# Patient Record
Sex: MALE | Race: WHITE | URBAN - METROPOLITAN AREA
[De-identification: names, ages, dates, MRNs, and addresses within clinical notes are randomized per-mention and may not be internally consistent; named-entity substitution may affect disease eponyms.]

---

## 2023-01-11 ENCOUNTER — OFFICE VISIT (OUTPATIENT)
Dept: URBAN - METROPOLITAN AREA CLINIC 96 | Facility: CLINIC | Age: 38
End: 2023-01-11
Payer: COMMERCIAL

## 2023-01-11 ENCOUNTER — LAB OUTSIDE AN ENCOUNTER (OUTPATIENT)
Dept: URBAN - METROPOLITAN AREA CLINIC 96 | Facility: CLINIC | Age: 38
End: 2023-01-11

## 2023-01-11 ENCOUNTER — WEB ENCOUNTER (OUTPATIENT)
Dept: URBAN - METROPOLITAN AREA CLINIC 96 | Facility: CLINIC | Age: 38
End: 2023-01-11

## 2023-01-11 VITALS
DIASTOLIC BLOOD PRESSURE: 77 MMHG | WEIGHT: 178 LBS | TEMPERATURE: 97.9 F | HEIGHT: 68 IN | SYSTOLIC BLOOD PRESSURE: 126 MMHG | HEART RATE: 87 BPM | BODY MASS INDEX: 26.98 KG/M2

## 2023-01-11 DIAGNOSIS — R79.89 ELEVATED LIVER FUNCTION TESTS: ICD-10-CM

## 2023-01-11 DIAGNOSIS — Z87.19 HISTORY OF ANAL FISSURES: ICD-10-CM

## 2023-01-11 PROCEDURE — 99204 OFFICE O/P NEW MOD 45 MIN: CPT | Performed by: INTERNAL MEDICINE

## 2023-01-11 RX ORDER — CITALOPRAM 20 MG/1
TABLET ORAL
Qty: 0 | Refills: 0 | COMMUNITY
Start: 1900-01-01

## 2023-01-11 RX ORDER — MIRTAZAPINE 15 MG/1
TABLET, FILM COATED ORAL
Qty: 0 | Refills: 0 | COMMUNITY
Start: 1900-01-01

## 2023-01-11 NOTE — HPI-TODAY'S VISIT:
This is a 37-year-old male referred for GI consultation and a copy will be sent to the referring provider who is Dr. Rolando Sunshine.  Patient did have a history of anal fissure that was treated medically by Dr. Flo Randhawa.  I met the patient in 2018 at which time he was having anal pain with bowel movements.  He had some constipation issues and bleeding upon wiping.  It was recommended that he do a colonoscopy and I did put a order in for the exam.  I also referred him to Dr. Flo Randhawa for the fissure.  It does not appear that the patient did the colonoscopy. No issues with fissure or constipation since surgeyr. He does not want to do a colonoscopy since all issues resolved.  Pt had insurance physical and GGT was high and this was repeated by PMD and still high Pt has no abd pain, n/v, reflux or any symptoms.  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 labs 12/16/22- normal bili, alt, ast, alk phos. His GGT was 184 (12-64). Insurance labs done 12/122- AST 27, ALT borderline high on their normals at 49 (0-45) and GGT was high at 160 (2-65). cholesterol 275 (140-200). Trig high at 261 (). Pt was on mirtazapine for anxiety and he now stopped this. Pt says his HCT tends to be high so he donated blood last week. No known fam hx of liver disease. In general he drinks 12 drinks a week on Saturdays (football or golf). Sunday- Wed not much drinking. Pt takes a mens vitamin daily and denies supplements.

## 2023-01-30 ENCOUNTER — OFFICE VISIT (OUTPATIENT)
Dept: URBAN - METROPOLITAN AREA CLINIC 95 | Facility: CLINIC | Age: 38
End: 2023-01-30
Payer: COMMERCIAL

## 2023-01-30 DIAGNOSIS — R93.2 ABNORMAL LIVER ULTRASOUND: ICD-10-CM

## 2023-01-30 PROCEDURE — 76700 US EXAM ABDOM COMPLETE: CPT | Performed by: INTERNAL MEDICINE

## 2023-01-30 RX ORDER — MIRTAZAPINE 15 MG/1
TABLET, FILM COATED ORAL
Qty: 0 | Refills: 0 | COMMUNITY
Start: 1900-01-01

## 2023-01-30 RX ORDER — CITALOPRAM 20 MG/1
TABLET ORAL
Qty: 0 | Refills: 0 | COMMUNITY
Start: 1900-01-01

## 2023-01-31 ENCOUNTER — WEB ENCOUNTER (OUTPATIENT)
Dept: URBAN - METROPOLITAN AREA CLINIC 96 | Facility: CLINIC | Age: 38
End: 2023-01-31

## 2023-02-03 ENCOUNTER — OFFICE VISIT (OUTPATIENT)
Dept: URBAN - METROPOLITAN AREA TELEHEALTH 2 | Facility: TELEHEALTH | Age: 38
End: 2023-02-03
Payer: COMMERCIAL

## 2023-02-03 VITALS — HEIGHT: 68 IN | BODY MASS INDEX: 26.52 KG/M2 | WEIGHT: 175 LBS

## 2023-02-03 DIAGNOSIS — R74.8 ELEVATED LIVER ENZYMES: ICD-10-CM

## 2023-02-03 DIAGNOSIS — Z71.85 VACCINE COUNSELING: ICD-10-CM

## 2023-02-03 DIAGNOSIS — K76.0 FATTY LIVER: ICD-10-CM

## 2023-02-03 PROCEDURE — 99214 OFFICE O/P EST MOD 30 MIN: CPT

## 2023-02-03 RX ORDER — CITALOPRAM 20 MG/1
TABLET ORAL
Qty: 0 | Refills: 0 | COMMUNITY
Start: 1900-01-01

## 2023-02-03 RX ORDER — MIRTAZAPINE 15 MG/1
TABLET, FILM COATED ORAL
Qty: 0 | Refills: 0 | Status: ON HOLD | COMMUNITY
Start: 1900-01-01

## 2023-02-03 NOTE — HPI-TODAY'S VISIT:
This is a 37-year-old male referred for GI consultation and a copy will be sent to the referring provider who is Dr. Rolando Sunshine. He was previously seen by Dr. Conley and she ordered and RUQ US that showed fatty liver and sent him here.  Pt had insurance physical and GGT was high and this was repeated by PMD and still high Pt has no abd pain, n/v, reflux or any symptoms.  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 labs 12/16/22- normal bili, alt, ast, alk phos. His GGT was 184 (12-64). Insurance labs done 12/11/22- AST 27, ALT borderline high on their normals at 49 (0-45) and GGT was high at 160 (2-65). cholesterol 275 (140-200). Trig high at 261 (). Pt was on mirtazapine for anxiety and he now stopped this, has been off x 6 weeks Pt says his HCT tends to be high so he donated blood last week. No known fam hx of liver disease. In general he drinks 12 drinks a week on Saturdays (football or golf). Sunday- Wed not much drinking. Pt takes a mens vitamin daily and denies supplements. Discussed the labs and fatty liver and he will work on this. Discussed need to see labs off mirtazapine and ruleout other causes for elevated liver enzymes and fatty liver.

## 2023-02-07 ENCOUNTER — TELEPHONE ENCOUNTER (OUTPATIENT)
Dept: URBAN - METROPOLITAN AREA CLINIC 92 | Facility: CLINIC | Age: 38
End: 2023-02-07

## 2023-02-08 ENCOUNTER — WEB ENCOUNTER (OUTPATIENT)
Dept: URBAN - METROPOLITAN AREA CLINIC 86 | Facility: CLINIC | Age: 38
End: 2023-02-08

## 2023-02-10 ENCOUNTER — WEB ENCOUNTER (OUTPATIENT)
Dept: URBAN - METROPOLITAN AREA CLINIC 86 | Facility: CLINIC | Age: 38
End: 2023-02-10

## 2023-02-10 ENCOUNTER — LAB OUTSIDE AN ENCOUNTER (OUTPATIENT)
Dept: URBAN - METROPOLITAN AREA CLINIC 92 | Facility: CLINIC | Age: 38
End: 2023-02-10

## 2023-02-17 ENCOUNTER — LAB OUTSIDE AN ENCOUNTER (OUTPATIENT)
Dept: URBAN - METROPOLITAN AREA TELEHEALTH 2 | Facility: TELEHEALTH | Age: 38
End: 2023-02-17

## 2023-02-20 LAB
A/G RATIO: 2.6
ABSOLUTE BASOPHILS: 32
ABSOLUTE EOSINOPHILS: 101
ABSOLUTE LYMPHOCYTES: 2115
ABSOLUTE MONOCYTES: 355
ABSOLUTE NEUTROPHILS: 2698
ACTIN (SMOOTH MUSCLE) ANTIBODY (IGG): <20
ALBUMIN: 5
ALKALINE PHOSPHATASE: 53
ALPHA-1-ANTITRYPSIN (AAT) PHENOTYPE: (no result)
ALPHA-1-ANTITRYPSIN QN: 126
ALT (SGPT): 23
ANA SCREEN, IFA: NEGATIVE
AST (SGOT): 13
BASOPHILS: 0.6
BILIRUBIN, TOTAL: 0.8
BUN/CREATININE RATIO: (no result)
BUN: 16
CALCIUM: 9.9
CARBON DIOXIDE, TOTAL: 25
CERULOPLASMIN: 25
CHLORIDE: 102
CREATININE: 1.04
EGFR: 94
EOSINOPHILS: 1.9
FERRITIN, SERUM: 156
GGT: 78
GLOBULIN, TOTAL: 1.9
GLUCOSE: 74
HEMATOCRIT: 47.5
HEMOGLOBIN: 16.3
HEPATITIS A AB, TOTAL: REACTIVE
HEPATITIS B CORE AB TOTAL: (no result)
HEPATITIS B SURFACE AG,QN: <0.05
HEPATITIS B SURFACE ANTIBODY QL: REACTIVE
HEPATITIS C ANTIBODY: (no result)
INDEX: <0.02
IRON BIND.CAP.(TIBC): 376
IRON SATURATION: 30
IRON: 113
LYMPHOCYTES: 39.9
MCH: 29.5
MCHC: 34.3
MCV: 86.1
MITOCHONDRIAL AB SCREEN: NEGATIVE
MONOCYTES: 6.7
MPV: 11.6
NEUTROPHILS: 50.9
PLATELET COUNT: 164
POTASSIUM: 4.3
PROTEIN, TOTAL: 6.9
RDW: 12.6
RED BLOOD CELL COUNT: 5.52
SODIUM: 139
WHITE BLOOD CELL COUNT: 5.3

## 2023-02-21 ENCOUNTER — CLAIMS CREATED FROM THE CLAIM WINDOW (OUTPATIENT)
Dept: URBAN - METROPOLITAN AREA TELEHEALTH 2 | Facility: TELEHEALTH | Age: 38
End: 2023-02-21
Payer: COMMERCIAL

## 2023-02-21 VITALS — HEIGHT: 68 IN | WEIGHT: 170 LBS | BODY MASS INDEX: 25.76 KG/M2

## 2023-02-21 DIAGNOSIS — E78.2 MIXED HYPERLIPIDEMIA: ICD-10-CM

## 2023-02-21 DIAGNOSIS — K76.0 FATTY LIVER: ICD-10-CM

## 2023-02-21 PROBLEM — 443913008: Status: ACTIVE | Noted: 2023-02-02

## 2023-02-21 PROBLEM — 267434003: Status: ACTIVE | Noted: 2023-02-21

## 2023-02-21 PROBLEM — 707724006: Status: ACTIVE | Noted: 2023-02-01

## 2023-02-21 PROBLEM — 197321007: Status: ACTIVE | Noted: 2023-02-01

## 2023-02-21 PROCEDURE — 99214 OFFICE O/P EST MOD 30 MIN: CPT

## 2023-02-21 RX ORDER — MIRTAZAPINE 15 MG/1
TABLET, FILM COATED ORAL
Qty: 0 | Refills: 0 | Status: ON HOLD | COMMUNITY
Start: 1900-01-01

## 2023-02-21 RX ORDER — CITALOPRAM 20 MG/1
TABLET ORAL
Qty: 0 | Refills: 0 | COMMUNITY
Start: 1900-01-01

## 2023-02-21 NOTE — HPI-TODAY'S VISIT:
This is a 38-year-old male referred for GI consultation and a copy will be sent to the referring provider who is Dr. Rolando Sunshine. He was previously seen by Dr. Conley and she ordered and RUQ US that showed fatty liver and sent him here.   2/21/23 telemed remaineder of the screens done and ggt and liver enzymes now normal. Screenings were negative. Of mirtazapine and historically since on the medication his labs were elevated. He has reduced etoh some. Discussed both could be playing a role and that he needs to continue to work on this. As you can see also the cholesterol has been elevated historically and he needs to work on this as it is a risk factor for fatty liver. He is doing labs again in may/breonna with pcp and he will send to us. We can check the US again in 6 months   Patient also emailed labs to us and of note and December 2022 ALT 50, AST 22, alkaline phosphatase 63 May 2022 AST 18 ALT 36.  Total cholesterol 288, triglycerides 309, HDL 36, .  July 2021 AST 24 ALT 43.  July 2021 total cholesterol 276. recap Pt had insurance physical and GGT was high and this was repeated by PMD and still high Pt has no abd pain, n/v, reflux or any symptoms.  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 labs 12/16/22- normal bili, alt, ast, alk phos. His GGT was 184 (12-64). Insurance labs done 12/11/22- AST 27, ALT borderline high on their normals at 49 (0-45) and GGT was high at 160 (2-65). cholesterol 275 (140-200). Trig high at 261 (). Pt was on mirtazapine for anxiety and he now stopped this, has been off x 6 weeks Pt says his HCT tends to be high so he donated blood last week. No known fam hx of liver disease. In general he drinks 12 drinks a week on Saturdays (football or golf). Sunday- Wed not much drinking. Pt takes a mens vitamin daily and denies supplements. Discussed the labs and fatty liver and he will work on this. Discussed need to see labs off mirtazapine and ruleout other causes for elevated liver enzymes and fatty liver.

## 2023-07-24 ENCOUNTER — LAB OUTSIDE AN ENCOUNTER (OUTPATIENT)
Dept: URBAN - METROPOLITAN AREA TELEHEALTH 2 | Facility: TELEHEALTH | Age: 38
End: 2023-07-24

## 2023-07-31 ENCOUNTER — LAB OUTSIDE AN ENCOUNTER (OUTPATIENT)
Dept: URBAN - METROPOLITAN AREA TELEHEALTH 2 | Facility: TELEHEALTH | Age: 38
End: 2023-07-31

## 2023-08-22 ENCOUNTER — TELEPHONE ENCOUNTER (OUTPATIENT)
Dept: URBAN - METROPOLITAN AREA CLINIC 96 | Facility: CLINIC | Age: 38
End: 2023-08-22

## 2023-08-28 ENCOUNTER — LAB OUTSIDE AN ENCOUNTER (OUTPATIENT)
Dept: URBAN - METROPOLITAN AREA CLINIC 96 | Facility: CLINIC | Age: 38
End: 2023-08-28

## 2023-08-28 ENCOUNTER — TELEPHONE ENCOUNTER (OUTPATIENT)
Dept: URBAN - METROPOLITAN AREA CLINIC 96 | Facility: CLINIC | Age: 38
End: 2023-08-28

## 2023-08-28 RX ORDER — CITALOPRAM 20 MG/1
TABLET ORAL
Qty: 0 | Refills: 0 | COMMUNITY
Start: 1900-01-01

## 2023-08-28 RX ORDER — MIRTAZAPINE 15 MG/1
TABLET, FILM COATED ORAL
Qty: 0 | Refills: 0 | Status: ON HOLD | COMMUNITY
Start: 1900-01-01

## 2023-08-28 RX ORDER — SODIUM, POTASSIUM,MAG SULFATES 17.5-3.13G
354 ML SOLUTION, RECONSTITUTED, ORAL ORAL AS DIRECTED
Qty: 354 ML | Refills: 0 | OUTPATIENT
Start: 2023-08-28 | End: 2023-08-29

## 2023-08-28 RX ORDER — ONDANSETRON 4 MG/1
2 TABLETS TABLET, FILM COATED ORAL
Qty: 2 TABLETS | Refills: 0 | OUTPATIENT
Start: 2023-08-28

## 2023-08-30 ENCOUNTER — TELEPHONE ENCOUNTER (OUTPATIENT)
Dept: URBAN - METROPOLITAN AREA CLINIC 96 | Facility: CLINIC | Age: 38
End: 2023-08-30

## 2023-10-02 ENCOUNTER — WEB ENCOUNTER (OUTPATIENT)
Dept: URBAN - METROPOLITAN AREA CLINIC 86 | Facility: CLINIC | Age: 38
End: 2023-10-02

## 2023-10-03 ENCOUNTER — TELEPHONE ENCOUNTER (OUTPATIENT)
Dept: URBAN - METROPOLITAN AREA CLINIC 86 | Facility: CLINIC | Age: 38
End: 2023-10-03

## 2023-10-03 ENCOUNTER — LAB OUTSIDE AN ENCOUNTER (OUTPATIENT)
Dept: URBAN - METROPOLITAN AREA CLINIC 86 | Facility: CLINIC | Age: 38
End: 2023-10-03

## 2023-10-04 ENCOUNTER — OFFICE VISIT (OUTPATIENT)
Dept: URBAN - METROPOLITAN AREA CLINIC 86 | Facility: CLINIC | Age: 38
End: 2023-10-04

## 2023-10-04 RX ORDER — MIRTAZAPINE 15 MG/1
TABLET, FILM COATED ORAL
Qty: 0 | Refills: 0 | COMMUNITY
Start: 1900-01-01

## 2023-10-04 RX ORDER — CITALOPRAM 20 MG/1
TABLET ORAL
Qty: 0 | Refills: 0 | COMMUNITY
Start: 1900-01-01

## 2023-10-04 RX ORDER — ONDANSETRON 4 MG/1
2 TABLETS TABLET, FILM COATED ORAL
Qty: 2 TABLETS | Refills: 0 | Status: ACTIVE | COMMUNITY
Start: 2023-08-28

## 2023-10-04 NOTE — HPI-TODAY'S VISIT:
This is a 38-year-old male referred for GI consultation and a copy will be sent to the referring provider who is Dr. Rolando Sunshine. He was previously seen by Dr. Conley and she ordered and RUQ US that showed fatty liver and sent him here.   2/21/23 telemed remaineder of the screens done and ggt and liver enzymes now normal. Screenings were negative. Of mirtazapine and historically since on the medication his labs were elevated. He has reduced etoh some. Discussed both could be playing a role and that he needs to continue to work on this. As you can see also the cholesterol has been elevated historically and he needs to work on this as it is a risk factor for fatty liver. He is doing labs again in may/breonna with pcp and he will send to us. We can check the US again in 6 months   Patient also emailed labs to us and of note and December 2022 ALT 50, AST 22, alkaline phosphatase 63 May 2022 AST 18 ALT 36.  Total cholesterol 288, triglycerides 309, HDL 36, .  July 2021 AST 24 ALT 43.  July 2021 total cholesterol 276. recap Pt had insurance physical and GGT was high and this was repeated by PMD and still high Pt has no abd pain, n/v, reflux or any symptoms.  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 labs 12/16/22- normal bili, alt, ast, alk phos. His GGT was 184 (12-64). Insurance labs done 12/11/22- AST 27, ALT borderline high on their normals at 49 (0-45) and GGT was high at 160 (2-65). cholesterol 275 (140-200). Trig high at 261 (). Pt was on mirtazapine for anxiety and he now stopped this, has been off x 6 weeks Pt says his HCT tends to be high so he donated blood last week. No known fam hx of liver disease. In general he drinks 12 drinks a week on Saturdays (football or golf). Sunday- Wed not much drinking. Pt takes a mens vitamin daily and denies supplements. Discussed the labs and fatty liver and he will work on this. Discussed need to see labs off mirtazapine and ruleout other causes for elevated liver enzymes and fatty liver.

## 2023-10-24 ENCOUNTER — OFFICE VISIT (OUTPATIENT)
Dept: URBAN - METROPOLITAN AREA CLINIC 97 | Facility: CLINIC | Age: 38
End: 2023-10-24
Payer: COMMERCIAL

## 2023-10-24 DIAGNOSIS — R93.2 ABNORMAL ULTRASOUND OF LIVER: ICD-10-CM

## 2023-10-24 PROCEDURE — 76705 ECHO EXAM OF ABDOMEN: CPT

## 2023-10-24 PROCEDURE — 93975 VASCULAR STUDY: CPT

## 2023-10-24 RX ORDER — MIRTAZAPINE 15 MG/1
TABLET, FILM COATED ORAL
Qty: 0 | Refills: 0 | COMMUNITY
Start: 1900-01-01

## 2023-10-24 RX ORDER — ONDANSETRON 4 MG/1
2 TABLETS TABLET, FILM COATED ORAL
Qty: 2 TABLETS | Refills: 0 | Status: ACTIVE | COMMUNITY
Start: 2023-08-28

## 2023-10-24 RX ORDER — CITALOPRAM 20 MG/1
TABLET ORAL
Qty: 0 | Refills: 0 | COMMUNITY
Start: 1900-01-01

## 2023-10-25 ENCOUNTER — TELEPHONE ENCOUNTER (OUTPATIENT)
Dept: URBAN - METROPOLITAN AREA CLINIC 86 | Facility: CLINIC | Age: 38
End: 2023-10-25

## 2023-10-25 ENCOUNTER — WEB ENCOUNTER (OUTPATIENT)
Dept: URBAN - METROPOLITAN AREA CLINIC 86 | Facility: CLINIC | Age: 38
End: 2023-10-25

## 2023-10-25 NOTE — HPI-TODAY'S VISIT:
Dear Ludwin Go,   The 10/24/23 ultrasound was sent to me. The liver echogenicity is increased, no focal lesions. common bile duct 2.9mm, normal. The gallbladder normal. Pancreas where seen normal. The right kidney appears normal. The hepatic vasculature patent. Spleen 11.5 cm and stable. Overall they saw fatty liver which was seen prior but seems stable. Need to work on the risk factors and we can follow this over time.  Carmelita Boykin PA-C

## 2023-10-26 LAB
ALBUMIN/GLOBULIN RATIO: 2.4
ALBUMIN: 4.5
ALKALINE PHOSPHATASE: 52
ALT (SGPT): 21
AST (SGOT): 15
BILIRUBIN, DIRECT: 0.1
BILIRUBIN, INDIRECT: 0.4
BILIRUBIN, TOTAL: 0.5
GLOBULIN: 1.9
PROTEIN, TOTAL: 6.4

## 2023-10-31 ENCOUNTER — OFFICE VISIT (OUTPATIENT)
Dept: URBAN - METROPOLITAN AREA CLINIC 86 | Facility: CLINIC | Age: 38
End: 2023-10-31
Payer: COMMERCIAL

## 2023-10-31 VITALS
HEIGHT: 68 IN | TEMPERATURE: 97.2 F | HEART RATE: 88 BPM | DIASTOLIC BLOOD PRESSURE: 83 MMHG | BODY MASS INDEX: 26.3 KG/M2 | WEIGHT: 173.5 LBS | SYSTOLIC BLOOD PRESSURE: 128 MMHG

## 2023-10-31 DIAGNOSIS — R74.8 ELEVATED LIVER ENZYMES: ICD-10-CM

## 2023-10-31 DIAGNOSIS — K76.0 FATTY LIVER: ICD-10-CM

## 2023-10-31 DIAGNOSIS — Z71.85 VACCINE COUNSELING: ICD-10-CM

## 2023-10-31 DIAGNOSIS — E78.2 MIXED HYPERLIPIDEMIA: ICD-10-CM

## 2023-10-31 PROCEDURE — 99214 OFFICE O/P EST MOD 30 MIN: CPT | Performed by: PHYSICIAN ASSISTANT

## 2023-10-31 RX ORDER — CITALOPRAM 20 MG/1
TABLET ORAL
Qty: 0 | Refills: 0 | COMMUNITY
Start: 1900-01-01

## 2023-10-31 RX ORDER — ONDANSETRON 4 MG/1
2 TABLETS TABLET, FILM COATED ORAL
Qty: 2 TABLETS | Refills: 0 | Status: ACTIVE | COMMUNITY
Start: 2023-08-28

## 2023-10-31 RX ORDER — MIRTAZAPINE 15 MG/1
TABLET, FILM COATED ORAL
Qty: 0 | Refills: 0 | COMMUNITY
Start: 1900-01-01

## 2023-10-31 NOTE — HPI-TODAY'S VISIT:
This is a 38-year-old male referred for GI consultation and a copy will be sent to the referring provider who is Dr. Rolando Sunshine. He was previously seen by Dr. Conley and she ordered and RUQ US that showed fatty liver and sent him here.   10/31/23 ov 10/2023 labs alp 52, ast 15, alt 21 saw pcp labs and chol up still and has had calcium score and zero and discussed diet for this. likely genetic has family member with hfe and his iron studies and ferritin very normal. distant family ggt was up at [physical but after a weekend of golf and so more etoh and explains this as his as/alt were up as well. ast 42, alt 22 and clearly much better now.  this is not typical  reviewed below us and we can do elastography next time  The 10/24/23 ultrasound was sent to me. The liver echogenicity is increased, no focal lesions. common bile duct 2.9mm, normal. The gallbladder normal. Pancreas where seen normal. The right kidney appears normal. The hepatic vasculature patent. Spleen 11.5 cm and stable. Overall they saw fatty liver which was seen prior but seems stable. Need to work on the risk factors and we can follow this over time.  Carmelita Boykin PA-C.  ember 2/21/23 telemed remaineder of the screens done and ggt and liver enzymes now normal. Screenings were negative. Of mirtazapine and historically since on the medication his labs were elevated. He has reduced etoh some. Discussed both could be playing a role and that he needs to continue to work on this. As you can see also the cholesterol has been elevated historically and he needs to work on this as it is a risk factor for fatty liver. He is doing labs again in may/breonna with pcp and he will send to us. We can check the US again in 6 months   Patient also emailed labs to us and of note and December 2022 ALT 50, AST 22, alkaline phosphatase 63 May 2022 AST 18 ALT 36.  Total cholesterol 288, triglycerides 309, HDL 36, .  July 2021 AST 24 ALT 43.  July 2021 total cholesterol 276.  Pt had insurance physical and GGT was high and this was repeated by PMD and still high Pt has no abd pain, n/v, reflux or any symptoms.  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 labs 12/16/22- normal bili, alt, ast, alk phos. His GGT was 184 (12-64). Insurance labs done 12/11/22- AST 27, ALT borderline high on their normals at 49 (0-45) and GGT was high at 160 (2-65). cholesterol 275 (140-200). Trig high at 261 (). Pt was on mirtazapine for anxiety and he now stopped this, has been off x 6 weeks Pt says his HCT tends to be high so he donated blood last week. No known fam hx of liver disease. In general he drinks 12 drinks a week on Saturdays (football or golf). Sunday- Wed not much drinking. Pt takes a mens vitamin daily and denies supplements. Discussed the labs and fatty liver and he will work on this. Discussed need to see labs off mirtazapine and ruleout other causes for elevated liver enzymes and fatty liver.

## 2023-12-14 ENCOUNTER — OFFICE VISIT (OUTPATIENT)
Dept: URBAN - METROPOLITAN AREA SURGERY CENTER 16 | Facility: SURGERY CENTER | Age: 38
End: 2023-12-14

## 2024-01-31 ENCOUNTER — LAB OUTSIDE AN ENCOUNTER (OUTPATIENT)
Dept: URBAN - METROPOLITAN AREA CLINIC 86 | Facility: CLINIC | Age: 39
End: 2024-01-31

## 2024-02-13 ENCOUNTER — COLON (OUTPATIENT)
Dept: URBAN - METROPOLITAN AREA SURGERY CENTER 16 | Facility: SURGERY CENTER | Age: 39
End: 2024-02-13
Payer: COMMERCIAL

## 2024-02-13 DIAGNOSIS — K62.5 ANAL BLEEDING: ICD-10-CM

## 2024-02-13 PROCEDURE — 45378 DIAGNOSTIC COLONOSCOPY: CPT | Performed by: INTERNAL MEDICINE

## 2024-02-13 RX ORDER — CITALOPRAM 20 MG/1
TABLET ORAL
Qty: 0 | Refills: 0 | COMMUNITY
Start: 1900-01-01

## 2024-02-13 RX ORDER — ONDANSETRON 4 MG/1
2 TABLETS TABLET, FILM COATED ORAL
Qty: 2 TABLETS | Refills: 0 | Status: ACTIVE | COMMUNITY
Start: 2023-08-28

## 2024-02-13 RX ORDER — MIRTAZAPINE 15 MG/1
TABLET, FILM COATED ORAL
Qty: 0 | Refills: 0 | COMMUNITY
Start: 1900-01-01

## 2024-03-20 ENCOUNTER — TELEP (OUTPATIENT)
Dept: URBAN - METROPOLITAN AREA TELEHEALTH 2 | Facility: TELEHEALTH | Age: 39
End: 2024-03-20
Payer: COMMERCIAL

## 2024-03-20 VITALS — HEIGHT: 68 IN | BODY MASS INDEX: 26.37 KG/M2 | WEIGHT: 174 LBS

## 2024-03-20 DIAGNOSIS — K64.8 INTERNAL HEMORRHOIDS: ICD-10-CM

## 2024-03-20 DIAGNOSIS — Z80.0 FAMILY HISTORY OF COLON CANCER: ICD-10-CM

## 2024-03-20 PROCEDURE — 99212 OFFICE O/P EST SF 10 MIN: CPT

## 2024-03-20 RX ORDER — MIRTAZAPINE 15 MG/1
TABLET, FILM COATED ORAL
Qty: 0 | Refills: 0 | Status: ACTIVE | COMMUNITY
Start: 1900-01-01

## 2024-03-20 RX ORDER — CITALOPRAM 20 MG/1
TABLET ORAL
Qty: 0 | Refills: 0 | Status: ACTIVE | COMMUNITY
Start: 1900-01-01

## 2024-03-20 NOTE — HPI-TODAY'S VISIT:
39-year-old male presents today for a follow-up visit. Sees liver center for fatty liver. Has h/o anal fissure dx in 2018. Had surgery with Dr. Randhawa. Due to intermittent rectal bleeding and fam h/x colon cancer in GF age 70s colonoscopy was obtained.  Colonoscopy 2- demonstrated fair prep some liquid residue in the right colon, internal hemorrhoids otherwise no abnormalities recommend repeat at age 45 or sooner if recurrent bleeding Currently has normal BM daily. Only time IH bother him is if he has diarrhea which is rare. No upper gi complaints.

## 2024-04-15 ENCOUNTER — LAB (OUTPATIENT)
Dept: URBAN - METROPOLITAN AREA CLINIC 86 | Facility: CLINIC | Age: 39
End: 2024-04-15

## 2024-04-16 ENCOUNTER — LAB (OUTPATIENT)
Dept: URBAN - METROPOLITAN AREA CLINIC 86 | Facility: CLINIC | Age: 39
End: 2024-04-16

## 2024-04-16 ENCOUNTER — OV EP (OUTPATIENT)
Dept: URBAN - METROPOLITAN AREA CLINIC 86 | Facility: CLINIC | Age: 39
End: 2024-04-16
Payer: COMMERCIAL

## 2024-04-16 VITALS
HEIGHT: 68 IN | HEART RATE: 81 BPM | BODY MASS INDEX: 26.07 KG/M2 | DIASTOLIC BLOOD PRESSURE: 84 MMHG | TEMPERATURE: 97 F | SYSTOLIC BLOOD PRESSURE: 131 MMHG | WEIGHT: 172 LBS

## 2024-04-16 DIAGNOSIS — R74.8 ELEVATED LIVER ENZYMES: ICD-10-CM

## 2024-04-16 DIAGNOSIS — K76.0 FATTY LIVER: ICD-10-CM

## 2024-04-16 DIAGNOSIS — E78.2 MIXED HYPERLIPIDEMIA: ICD-10-CM

## 2024-04-16 DIAGNOSIS — F41.1 ANXIETY, GENERALIZED: ICD-10-CM

## 2024-04-16 DIAGNOSIS — Z71.85 VACCINE COUNSELING: ICD-10-CM

## 2024-04-16 PROBLEM — 21897009: Status: ACTIVE | Noted: 2024-04-16

## 2024-04-16 PROCEDURE — 99214 OFFICE O/P EST MOD 30 MIN: CPT

## 2024-04-16 RX ORDER — MIRTAZAPINE 15 MG/1
TABLET, FILM COATED ORAL
Qty: 0 | Refills: 0 | Status: DISCONTINUED | COMMUNITY
Start: 1900-01-01

## 2024-04-16 RX ORDER — CITALOPRAM 20 MG/1
TABLET ORAL
Qty: 0 | Refills: 0 | Status: ACTIVE | COMMUNITY
Start: 1900-01-01

## 2024-04-16 NOTE — EXAM-PHYSICAL EXAM
Gen: awake and responsive.  Eyes: anicteric,normal lids.  Mouth: mask on.  Nose: mask on.  Hearing: intact grossly.  Neck: trachea midline and no jvd.  CV: RRR no s3.  Lungs: clear. No wheezes.  Abd: Soft, nabs, nr,NT. No appreciable liver or spleen enlargement.  Ext: no sig edema, and mild palm erythema.  Neuro: moves all 4 ext grossly. No asterixis.

## 2024-04-16 NOTE — HPI-TODAY'S VISIT:
This is a 39-year-old male seen oct 2023 by Ms Boykin and referred for GI consultation and a copy will be sent to the referring provider who is Dr. Chavez.  He was previously seen by Dr. Conley and she ordered and RUQ US that showed fatty liver and sent him here.   4/4/24 ultrasound and elastography was sent to me. The liver is normal in contour and echotexture measuring 15.1 cm and this is normal. Gallbladder free of stones or sludge. They did not see any ductal dilatation. Right kidney appears normal. Left kidney appears normal. Spleen 12.2 cm and this is normal. The elastography testing was done and the median reading was 3.44 kPa. This is consistent with mild to no stiffness is less than 5.4 kPa which is good to note. The labs were also sent to me and the GGT slightly elevated at 97. Previously 94 back in February and may be we need to continue to monitor this periodically. The bilirubin 0.5, alkaline phosphatase 50, AST 19 and ALT 43. Goal for the AST and ALT is less than 35. Previously the AST was 14 and the ALT was 23 back in February. Will review this at the follow-up and see how you are doing.  He says re his fatty liver he has been steady on weight and peak was 185 and now at 175. Prior did a lot of walking and just starting to do now. 30 min of walking a day can help this  2/23/24 labs were sent to me. The GGT 94, slightly elevated only by 4 points. We can periodically check this. The liver enzymes are normal though with the ast 14 and alt 23, happy to see this. Given this not as concered with the GGT and will discuss at the follow up.   No illness to explain the changes.  He may have a sinus issue in early march.  10/31/23 ov 10/2023 labs alp 52, ast 15, alt 21 saw pcp labs and chol up still and has had calcium score and zero and discussed diet for this. likely genetic has family member with hfe and his iron studies and ferritin very normal. distant family ggt was up at [physical but after a weekend of golf and so more etoh and explains this as his as/alt were up as well. ast 42, alt 22 and clearly much better now.  this is not typical  reviewed below us and we can do elastography next time   10/24/23 ultrasound was sent to me. The liver echogenicity is increased, no focal lesions. common bile duct 2.9mm, normal. The gallbladder normal. Pancreas where seen normal. The right kidney appears normal. The hepatic vasculature patent. Spleen 11.5 cm and stable. Overall they saw fatty liver which was seen prior but seems stable. Need to work on the risk factors and we can follow this over time.   2/21/23 telemed remainder of the screens done and ggt and liver enzymes now normal. Screenings were negative. Of mirtazapine and historically since on the medication his labs were elevated. He has reduced etoh some. Discussed both could be playing a role and that he needs to continue to work on this. As you can see also the cholesterol has been elevated historically and he needs to work on this as it is a risk factor for fatty liver. He is doing labs again in may/breonna with pcp and he will send to us. We can check the US again in 6 months   Patient also emailed labs to us and of note and December 2022 ALT 50, AST 22, alkaline phosphatase 63 May 2022 AST 18 ALT 36.  Total cholesterol 288, triglycerides 309, HDL 36, .  July 2021 AST 24 ALT 43.  July 2021 total cholesterol 276.  Pt had insurance physical and GGT was high and this was repeated by PMD and still high Pt has no abd pain, n/v, reflux or any symptoms.  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 labs 12/16/22- normal bili, alt, ast, alk phos. His GGT was 184 (12-64). Insurance labs done 12/11/22- AST 27, ALT borderline high on their normals at 49 (0-45) and GGT was high at 160 (2-65). cholesterol 275 (140-200). Trig high at 261 (). Pt was on mirtazapine for anxiety and he now stopped this, has been off x 6 weeks Pt says his HCT tends to be high so he donated blood last week. No known fam hx of liver disease. In general he drinks 12 drinks a week on Saturdays (football or golf). Sunday- Wed not much drinking. Pt takes a mens vitamin daily and denies supplements. Mp iron in it. Discussed the labs and fatty liver and he will work on this. Discussed need to see labs off mirtazapine and ruleout other causes for elevated liver enzymes and fatty liver.  Started to do blood donation once a quarter.  Plan: 1. [facility] and also done prior [facility] in [place]. 2. Pt will do labs then. 3. Stay healthy habits and follow course. 4. Key is exercise as that appears to be helping.  Duration of visit was 35 minutes with 10 minutes spent prepping chart and loading info for the visit to [facility] records and then 25 additional minutes spent for this face to face visit reviewing chart and events and plans with the pt.

## 2024-04-23 ENCOUNTER — LAB (OUTPATIENT)
Dept: URBAN - METROPOLITAN AREA CLINIC 86 | Facility: CLINIC | Age: 39
End: 2024-04-23

## 2024-10-01 ENCOUNTER — LAB OUTSIDE AN ENCOUNTER (OUTPATIENT)
Dept: URBAN - METROPOLITAN AREA CLINIC 86 | Facility: CLINIC | Age: 39
End: 2024-10-01

## 2024-10-14 ENCOUNTER — OFFICE VISIT (OUTPATIENT)
Dept: URBAN - METROPOLITAN AREA CLINIC 86 | Facility: CLINIC | Age: 39
End: 2024-10-14

## 2024-10-14 NOTE — HPI-TODAY'S VISIT:
This is a 39-year-old male seen April 2024 and referred for GI consultation and a copy will be sent to the referring provider who is Dr. Chavez.  He was previously seen by Dr. Conley and she ordered and RUQ US that showed fatty liver and sent him here.   4/4/24 ultrasound and elastography was sent to me. The liver is normal in contour and echotexture measuring 15.1 cm and this is normal. Gallbladder free of stones or sludge. They did not see any ductal dilatation. Right kidney appears normal. Left kidney appears normal. Spleen 12.2 cm and this is normal. The elastography testing was done and the median reading was 3.44 kPa. This is consistent with mild to no stiffness is less than 5.4 kPa which is good to note. The labs were also sent to me and the GGT slightly elevated at 97. Previously 94 back in February and may be we need to continue to monitor this periodically. The bilirubin 0.5, alkaline phosphatase 50, AST 19 and ALT 43. Goal for the AST and ALT is less than 35. Previously the AST was 14 and the ALT was 23 back in February. Will review this at the follow-up and see how you are doing.  He says re his fatty liver he has been steady on weight and peak was 185 and now at 175. Prior did a lot of walking and just starting to do now. 30 min of walking a day can help this  2/23/24 labs were sent to me. The GGT 94, slightly elevated only by 4 points. We can periodically check this. The liver enzymes are normal though with the ast 14 and alt 23, happy to see this. Given this not as concered with the GGT and will discuss at the follow up.   No illness to explain the changes.  He may have a sinus issue in early march.  10/31/23 ov 10/2023 labs alp 52, ast 15, alt 21 saw pcp labs and chol up still and has had calcium score and zero and discussed diet for this. likely genetic has family member with hfe and his iron studies and ferritin very normal. distant family ggt was up at [physical but after a weekend of golf and so more etoh and explains this as his as/alt were up as well. ast 42, alt 22 and clearly much better now.  this is not typical  reviewed below us and we can do elastography next time   10/24/23 ultrasound was sent to me. The liver echogenicity is increased, no focal lesions. common bile duct 2.9mm, normal. The gallbladder normal. Pancreas where seen normal. The right kidney appears normal. The hepatic vasculature patent. Spleen 11.5 cm and stable. Overall they saw fatty liver which was seen prior but seems stable. Need to work on the risk factors and we can follow this over time.   2/21/23 telemed remainder of the screens done and ggt and liver enzymes now normal. Screenings were negative. Of mirtazapine and historically since on the medication his labs were elevated. He has reduced etoh some. Discussed both could be playing a role and that he needs to continue to work on this. As you can see also the cholesterol has been elevated historically and he needs to work on this as it is a risk factor for fatty liver. He is doing labs again in may/breonna with pcp and he will send to us. We can check the US again in 6 months   Patient also emailed labs to us and of note and December 2022 ALT 50, AST 22, alkaline phosphatase 63 May 2022 AST 18 ALT 36.  Total cholesterol 288, triglycerides 309, HDL 36, .  July 2021 AST 24 ALT 43.  July 2021 total cholesterol 276.  Pt had insurance physical and GGT was high and this was repeated by PMD and still high Pt has no abd pain, n/v, reflux or any symptoms.  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 labs 12/16/22- normal bili, alt, ast, alk phos. His GGT was 184 (12-64). Insurance labs done 12/11/22- AST 27, ALT borderline high on their normals at 49 (0-45) and GGT was high at 160 (2-65). cholesterol 275 (140-200). Trig high at 261 (). Pt was on mirtazapine for anxiety and he now stopped this, has been off x 6 weeks Pt says his HCT tends to be high so he donated blood last week. No known fam hx of liver disease. In general he drinks 12 drinks a week on Saturdays (football or golf). Sunday- Wed not much drinking. Pt takes a mens vitamin daily and denies supplements. Mp iron in it. Discussed the labs and fatty liver and he will work on this. Discussed need to see labs off mirtazapine and ruleout other causes for elevated liver enzymes and fatty liver.  Started to do blood donation once a quarter.  Plan: 1. [facility] and also done prior [facility] in [place]. 2. Pt will do labs then. 3. Stay healthy habits and follow course. 4. Key is exercise as that appears to be helping.  Duration of visit was  minutes with 10 minutes spent prepping chart and loading info for the visit to [facility] records and then 25 additional minutes spent for this face to face visit reviewing chart and events and plans with the pt.